# Patient Record
Sex: FEMALE | Race: BLACK OR AFRICAN AMERICAN | NOT HISPANIC OR LATINO | URBAN - METROPOLITAN AREA
[De-identification: names, ages, dates, MRNs, and addresses within clinical notes are randomized per-mention and may not be internally consistent; named-entity substitution may affect disease eponyms.]

---

## 2017-01-29 ENCOUNTER — EMERGENCY (EMERGENCY)
Facility: HOSPITAL | Age: 52
LOS: 1 days | Discharge: HOME | End: 2017-01-29
Attending: EMERGENCY MEDICINE
Payer: SELF-PAY

## 2017-01-29 VITALS
SYSTOLIC BLOOD PRESSURE: 120 MMHG | HEART RATE: 79 BPM | DIASTOLIC BLOOD PRESSURE: 86 MMHG | TEMPERATURE: 98 F | RESPIRATION RATE: 18 BRPM | OXYGEN SATURATION: 98 %

## 2017-01-29 VITALS
RESPIRATION RATE: 20 BRPM | SYSTOLIC BLOOD PRESSURE: 115 MMHG | OXYGEN SATURATION: 99 % | DIASTOLIC BLOOD PRESSURE: 77 MMHG | TEMPERATURE: 98 F | HEART RATE: 80 BPM

## 2017-01-29 DIAGNOSIS — H05.012 CELLULITIS OF LEFT ORBIT: ICD-10-CM

## 2017-01-29 DIAGNOSIS — H57.8 OTHER SPECIFIED DISORDERS OF EYE AND ADNEXA: ICD-10-CM

## 2017-01-29 DIAGNOSIS — I10 ESSENTIAL (PRIMARY) HYPERTENSION: ICD-10-CM

## 2017-01-29 LAB
BASOPHILS # BLD AUTO: 0 K/UL — SIGNIFICANT CHANGE UP (ref 0–0.2)
BASOPHILS NFR BLD AUTO: 0.3 % — SIGNIFICANT CHANGE UP (ref 0–2)
EOSINOPHIL # BLD AUTO: 0.1 K/UL — SIGNIFICANT CHANGE UP (ref 0–0.5)
EOSINOPHIL NFR BLD AUTO: 1.4 % — SIGNIFICANT CHANGE UP (ref 0–6)
HCG SERPL-ACNC: <2 MIU/ML — SIGNIFICANT CHANGE UP
HCT VFR BLD CALC: 38.8 % — SIGNIFICANT CHANGE UP (ref 37–47)
HGB BLD-MCNC: 13 G/DL — SIGNIFICANT CHANGE UP (ref 12–16)
LYMPHOCYTES # BLD AUTO: 2.8 K/UL — SIGNIFICANT CHANGE UP (ref 1–4.8)
LYMPHOCYTES # BLD AUTO: 35.1 % — SIGNIFICANT CHANGE UP (ref 20–55)
MCHC RBC-ENTMCNC: 30.7 PG — SIGNIFICANT CHANGE UP (ref 27–31)
MCHC RBC-ENTMCNC: 33.5 G/DL — SIGNIFICANT CHANGE UP (ref 32–36)
MCV RBC AUTO: 91.5 FL — SIGNIFICANT CHANGE UP (ref 81–99)
MONOCYTES # BLD AUTO: 0.3 K/UL — SIGNIFICANT CHANGE UP (ref 0–0.8)
MONOCYTES NFR BLD AUTO: 3.9 % — SIGNIFICANT CHANGE UP (ref 3–10)
NEUTROPHILS # BLD AUTO: 4.7 K/UL — SIGNIFICANT CHANGE UP (ref 1.8–8)
NEUTROPHILS NFR BLD AUTO: 59.2 % — SIGNIFICANT CHANGE UP (ref 37–73)
PLATELET # BLD AUTO: 227 K/UL — SIGNIFICANT CHANGE UP (ref 150–400)
RBC # BLD: 4.24 M/UL — LOW (ref 4.4–5.2)
RBC # FLD: 13.4 % — SIGNIFICANT CHANGE UP (ref 11–15.6)
WBC # BLD: 7.91 K/UL — SIGNIFICANT CHANGE UP (ref 4.8–10.8)
WBC # FLD AUTO: 7.91 K/UL — SIGNIFICANT CHANGE UP (ref 4.8–10.8)

## 2017-01-29 PROCEDURE — 70481 CT ORBIT/EAR/FOSSA W/DYE: CPT | Mod: 26

## 2017-01-29 PROCEDURE — 85027 COMPLETE CBC AUTOMATED: CPT

## 2017-01-29 PROCEDURE — 99284 EMERGENCY DEPT VISIT MOD MDM: CPT | Mod: 25

## 2017-01-29 PROCEDURE — 99284 EMERGENCY DEPT VISIT MOD MDM: CPT

## 2017-01-29 PROCEDURE — 70481 CT ORBIT/EAR/FOSSA W/DYE: CPT

## 2017-01-29 PROCEDURE — 84702 CHORIONIC GONADOTROPIN TEST: CPT

## 2017-01-29 RX ORDER — IBUPROFEN 200 MG
400 TABLET ORAL ONCE
Qty: 0 | Refills: 0 | Status: COMPLETED | OUTPATIENT
Start: 2017-01-29 | End: 2017-01-29

## 2017-01-29 RX ORDER — DIPHENHYDRAMINE HCL 50 MG
25 CAPSULE ORAL ONCE
Qty: 0 | Refills: 0 | Status: COMPLETED | OUTPATIENT
Start: 2017-01-29 | End: 2017-01-29

## 2017-01-29 RX ORDER — AZTREONAM 2 G
1 VIAL (EA) INJECTION
Qty: 10 | Refills: 0 | OUTPATIENT
Start: 2017-01-29 | End: 2017-02-03

## 2017-01-29 RX ORDER — CEPHALEXIN 500 MG
500 CAPSULE ORAL ONCE
Qty: 0 | Refills: 0 | Status: COMPLETED | OUTPATIENT
Start: 2017-01-29 | End: 2017-01-29

## 2017-01-29 RX ORDER — CEPHALEXIN 500 MG
1 CAPSULE ORAL
Qty: 20 | Refills: 0 | OUTPATIENT
Start: 2017-01-29 | End: 2017-02-03

## 2017-01-29 RX ADMIN — Medication 25 MILLIGRAM(S): at 15:21

## 2017-01-29 RX ADMIN — Medication 400 MILLIGRAM(S): at 15:21

## 2017-01-29 RX ADMIN — Medication 500 MILLIGRAM(S): at 15:21

## 2017-01-29 RX ADMIN — Medication 1 TABLET(S): at 20:18

## 2017-01-29 NOTE — ED STATDOCS - MEDICAL DECISION MAKING DETAILS
Periorbital swelling with pain and injection to left eye. Notes granular mass to left upper eyelid, not appreciated on PE. Suspicious for conjunctivitis with early preseptal cellulitis. Will treat accordingly. Place in ST for fluorescein staining, lid eversion, and tonal pen. Antibiotics on discharge with ophthalmologist f/u as instructed.

## 2017-01-29 NOTE — ED STATDOCS - PROGRESS NOTE DETAILS
PA Note- 50 yo female, PMHx HTN, presents c/o worsening left eye pain and swelling x 3 days. Pain behind L eye, radiating down into L mandible with L frontal HA. +Pain with eye movement. Pain not alleviated by Ibuprofen at home, last dose last night. Also with +discharge from L eye and L eye redness. Denies visual changes, fever, photophobia.  PE- Well appearing female in NAD. EYES- +Erythema, mild edema, and increased warmth to skin of upper and lower lid of left eye. +L eye conjunctival injection with mild chemosis. Pterygium noted in L medial eye. PERRL bilaterally. Visual acuity 20/25 OS/OD. Pain with L EOMs and TTP over L lacrimal apperatus. +Green purulent discharge at left lateral canthus. Wood's lamp and fluorescene stain negative for corneal abrasion or ulceration of L eye.  A/P- Pt with periorbital cellulitis, with pain on ocular movement and chemosis. Will CT r/o orbital cellulitis. If negative, will tx with abx and refer to ophtho for close follow up. Results appreciated and discussed with patient and patient's son. Plan for follow up discussed at length. Patient Rx Bactrim and Keflex, and advised to call ophtho tomorrow to schedule follow up. Patient advised to return immediately for worsening symptoms.

## 2017-01-29 NOTE — ED STATDOCS - NS ED MD SCRIBE ATTENDING SCRIBE SECTIONS
HISTORY OF PRESENT ILLNESS/VITAL SIGNS( Pullset)/HIV/REVIEW OF SYSTEMS/PAST MEDICAL/SURGICAL/SOCIAL HISTORY/DISPOSITION/PHYSICAL EXAM

## 2017-01-29 NOTE — ED STATDOCS - ATTENDING CONTRIBUTION TO CARE
I, Mekhi Lehman, performed the initial face to face bedside interview with this patient regarding history of present illness, review of symptoms and relevant past medical, social and family history.  I completed an independent physical examination.  I was the initial provider who evaluated this patient. I have signed out the follow up of any pending tests (i.e. labs, radiological studies) to the ACP.  I have communicated the patient’s plan of care and disposition with the ACP.  The history, relevant review of systems, past medical and surgical history, medical decision making, and physical examination was documented by the scribe in my presence and I attest to the accuracy of the documentation.

## 2017-01-29 NOTE — ED STATDOCS - OBJECTIVE STATEMENT
52 y/o female with h/o cataracts presents to ED c/o left upper and lower eyelid redness and swelling that began 2 days ago, with eye pain and redness that began yesterday. Pt also reports HA that began 2 days ago. Denies n/v. Denies acute trauma to the left eye. No other PMHx or PSHx. No daily medications. No PMD. No further complaints at this time. Pt is from [facility] and is currently visiting the [place]

## 2017-01-29 NOTE — ED STATDOCS - EYES, MLM
EOMI. Pupils 3-4mm, equal, round, and reactive to light. Swelling and erythema to left upper and lower eyelid. Hint of discharge to lateral canthus. +Chemosis. Conjunctiva injected. Lens is clear. No hyphema.

## 2017-02-14 NOTE — ED STATDOCS - CHPI ED SYMPTOM POS
Pt 1st trimester screen results obt  Reviewed By MD Olivarez Enter  Low risk for trisomy 18/13/21  Less than 1 in 49973 risk for trisomy 18/13/21    Report sent for scanning into pt record PAIN

## 2018-09-24 NOTE — ED STATDOCS - DIAGNOSIS COUNSELING, MDM
2 RN skin check completed with ESTEBAN Tony.  Midline incision to the abdomen with island dressing in place.  Skin otherwise completely intact.   conducted a detailed discussion...